# Patient Record
Sex: MALE | Race: WHITE | HISPANIC OR LATINO | Employment: FULL TIME | URBAN - NONMETROPOLITAN AREA
[De-identification: names, ages, dates, MRNs, and addresses within clinical notes are randomized per-mention and may not be internally consistent; named-entity substitution may affect disease eponyms.]

---

## 2021-11-26 ENCOUNTER — OFFICE VISIT (OUTPATIENT)
Dept: URGENT CARE | Facility: CLINIC | Age: 36
End: 2021-11-26
Payer: COMMERCIAL

## 2021-11-26 VITALS
OXYGEN SATURATION: 96 % | WEIGHT: 266 LBS | HEIGHT: 72 IN | BODY MASS INDEX: 36.03 KG/M2 | DIASTOLIC BLOOD PRESSURE: 90 MMHG | SYSTOLIC BLOOD PRESSURE: 134 MMHG | TEMPERATURE: 97.2 F | RESPIRATION RATE: 16 BRPM | HEART RATE: 80 BPM

## 2021-11-26 DIAGNOSIS — S16.1XXA STRAIN OF NECK MUSCLE, INITIAL ENCOUNTER: ICD-10-CM

## 2021-11-26 DIAGNOSIS — S29.012A UPPER BACK STRAIN, INITIAL ENCOUNTER: ICD-10-CM

## 2021-11-26 PROCEDURE — 99203 OFFICE O/P NEW LOW 30 MIN: CPT | Performed by: NURSE PRACTITIONER

## 2021-11-26 RX ORDER — CYCLOBENZAPRINE HCL 10 MG
10 TABLET ORAL 3 TIMES DAILY PRN
Qty: 30 TABLET | Refills: 0 | Status: SHIPPED | OUTPATIENT
Start: 2021-11-26 | End: 2022-05-05

## 2021-11-26 RX ORDER — KETOROLAC TROMETHAMINE 30 MG/ML
30 INJECTION, SOLUTION INTRAMUSCULAR; INTRAVENOUS ONCE
Status: COMPLETED | OUTPATIENT
Start: 2021-11-26 | End: 2021-11-26

## 2021-11-26 RX ORDER — LOSARTAN POTASSIUM 25 MG/1
25 TABLET ORAL DAILY
COMMUNITY

## 2021-11-26 RX ORDER — DICLOFENAC SODIUM 75 MG/1
75 TABLET, DELAYED RELEASE ORAL 2 TIMES DAILY
Qty: 20 TABLET | Refills: 0 | Status: SHIPPED | OUTPATIENT
Start: 2021-11-26 | End: 2022-05-05

## 2021-11-26 RX ORDER — PAROXETINE 10 MG/1
37.5 TABLET, FILM COATED ORAL DAILY
COMMUNITY
End: 2022-05-05

## 2021-11-26 RX ADMIN — KETOROLAC TROMETHAMINE 30 MG: 30 INJECTION, SOLUTION INTRAMUSCULAR; INTRAVENOUS at 14:32

## 2021-11-26 ASSESSMENT — ENCOUNTER SYMPTOMS
BACK PAIN: 1
NECK PAIN: 1

## 2021-11-26 NOTE — PROGRESS NOTES
Subjective     Gabriel Matthews is a 36 y.o. male who presents with Shoulder Pain (R shoulder pain and soreness started 5 days ago)    No past medical history on file.  Social History     Socioeconomic History   • Marital status:      Spouse name: Not on file   • Number of children: Not on file   • Years of education: Not on file   • Highest education level: Not on file   Occupational History   • Not on file   Tobacco Use   • Smoking status: Never Smoker   • Smokeless tobacco: Never Used   Substance and Sexual Activity   • Alcohol use: Never   • Drug use: Never   • Sexual activity: Not on file   Other Topics Concern   • Not on file   Social History Narrative   • Not on file     Social Determinants of Health     Financial Resource Strain:    • Difficulty of Paying Living Expenses: Not on file   Food Insecurity:    • Worried About Running Out of Food in the Last Year: Not on file   • Ran Out of Food in the Last Year: Not on file   Transportation Needs:    • Lack of Transportation (Medical): Not on file   • Lack of Transportation (Non-Medical): Not on file   Physical Activity:    • Days of Exercise per Week: Not on file   • Minutes of Exercise per Session: Not on file   Stress:    • Feeling of Stress : Not on file   Social Connections:    • Frequency of Communication with Friends and Family: Not on file   • Frequency of Social Gatherings with Friends and Family: Not on file   • Attends Sikh Services: Not on file   • Active Member of Clubs or Organizations: Not on file   • Attends Club or Organization Meetings: Not on file   • Marital Status: Not on file   Intimate Partner Violence:    • Fear of Current or Ex-Partner: Not on file   • Emotionally Abused: Not on file   • Physically Abused: Not on file   • Sexually Abused: Not on file   Housing Stability:    • Unable to Pay for Housing in the Last Year: Not on file   • Number of Places Lived in the Last Year: Not on file   • Unstable Housing in the Last Year: Not  on file     No family history on file.    Allergies: Patient has no known allergies.    Patient is a 36-year-old male who presents today with complaint of pain to the right upper back and neck area.  Patient states he was ill last week and was in bed.  He states as a result he believes he strained his right side of neck and upper back.  Endorses pain with range of motion and any movement.        Shoulder Pain  This is a new problem. The current episode started in the past 7 days. The problem occurs constantly. The problem has been unchanged. Associated symptoms include neck pain. Associated symptoms comments: Neck and upper back pain. Nothing aggravates the symptoms. He has tried nothing for the symptoms.       Review of Systems   Musculoskeletal: Positive for back pain and neck pain.   All other systems reviewed and are negative.             Objective     /90   Pulse 80   Temp 36.2 °C (97.2 °F)   Resp 16   Ht 1.829 m (6')   Wt 121 kg (266 lb)   SpO2 96%   BMI 36.08 kg/m²      Physical Exam  Vitals reviewed.   Constitutional:       Appearance: Normal appearance.   Skin:     General: Skin is warm and dry.   Neurological:      General: No focal deficit present.      Mental Status: He is alert and oriented to person, place, and time.   Psychiatric:         Mood and Affect: Mood normal.         Behavior: Behavior normal.       There is point tenderness to the right side of the neck and the upper back.  Pain is reproduced with flexion extension of the neck and also with lateral rotation of the neck.  Point tenderness to the right upper back.                      Assessment & Plan   Neck muscle strain  Right upper back strain    Alternate ice and heat  Diclofenac  Flexeril; clearly stated no driving or alcohol with this medication  Topical muscle rub of choice  Follow up for persistent symptoms  Toradol 30 mg IM given in office      There are no diagnoses linked to this encounter.

## 2022-04-14 ENCOUNTER — OFFICE VISIT (OUTPATIENT)
Dept: INTERNAL MEDICINE | Facility: OTHER | Age: 37
End: 2022-04-14
Payer: COMMERCIAL

## 2022-04-14 VITALS
DIASTOLIC BLOOD PRESSURE: 77 MMHG | SYSTOLIC BLOOD PRESSURE: 114 MMHG | OXYGEN SATURATION: 94 % | WEIGHT: 262.2 LBS | HEIGHT: 72 IN | BODY MASS INDEX: 35.51 KG/M2 | HEART RATE: 89 BPM | TEMPERATURE: 97 F

## 2022-04-14 DIAGNOSIS — I10 PRIMARY HYPERTENSION: ICD-10-CM

## 2022-04-14 DIAGNOSIS — F41.9 ANXIETY DISORDER, UNSPECIFIED TYPE: ICD-10-CM

## 2022-04-14 DIAGNOSIS — E66.9 OBESITY (BMI 35.0-39.9 WITHOUT COMORBIDITY): ICD-10-CM

## 2022-04-14 DIAGNOSIS — E66.9 OBESITY (BMI 30-39.9): ICD-10-CM

## 2022-04-14 PROCEDURE — 99214 OFFICE O/P EST MOD 30 MIN: CPT | Mod: GC | Performed by: STUDENT IN AN ORGANIZED HEALTH CARE EDUCATION/TRAINING PROGRAM

## 2022-04-14 RX ORDER — HYDROXYZINE HYDROCHLORIDE 25 MG/1
50 TABLET, FILM COATED ORAL EVERY 6 HOURS PRN
Qty: 30 TABLET | Refills: 2 | Status: SHIPPED | OUTPATIENT
Start: 2022-04-14 | End: 2022-05-05

## 2022-04-14 RX ORDER — PAROXETINE HYDROCHLORIDE HEMIHYDRATE 37.5 MG/1
TABLET, FILM COATED, EXTENDED RELEASE ORAL
COMMUNITY
Start: 2022-04-14 | End: 2022-05-05 | Stop reason: SDUPTHER

## 2022-04-14 RX ORDER — CLONAZEPAM 1 MG/1
TABLET ORAL
COMMUNITY
Start: 2022-03-17 | End: 2022-05-05 | Stop reason: SDUPTHER

## 2022-04-14 ASSESSMENT — PATIENT HEALTH QUESTIONNAIRE - PHQ9: CLINICAL INTERPRETATION OF PHQ2 SCORE: 0

## 2022-04-14 NOTE — PATIENT INSTRUCTIONS
-Exercise and balanced nutrition  -Take medications as prescribed  -Follow-up with psychiatry  -Follow-up with PCP as scheduled or return to clinic earlier if any symptoms.  -start Hydroxyzine 50 mg Q6 hr as needed for anxiety

## 2022-04-14 NOTE — PROGRESS NOTES
New patient    Patient Care Team:  Ludwin Mustafa M.D. as PCP - General (Internal Medicine)    Gabriel Matthews is a 36 y.o. male who presents today with the following Chief Complaint(s): Follow up for Diagnoses of Primary hypertension, Anxiety disorder, unspecified type, Obesity (BMI 35.0-39.9 without comorbidity), and Obesity (BMI 30-39.9) were pertinent to this visit.    HPI:    36-year-old gentleman with known history of hypertension and anxiety disorder came for office visit to establish care    #Hypertension  -Well-controlled on losartan 25 mg daily    #Anxiety disorder  -Chronic, was on paroxetine since the age of low 20s  -Mention history of acute panic attacks along with social anxiety, clonazepam helps in the situations, was on Xanax prior to this.    #Obesity  -Education counseling provided  -Denied any abrupt weight gain recently.    Problem   Obesity (Bmi 30-39.9)        ROS:     Denies any new chest pain or shortness of breath.  No changes to urinary or bowel function. See HPI.    No past medical history on file.  Social History     Tobacco Use   • Smoking status: Never Smoker   • Smokeless tobacco: Never Used   Vaping Use   • Vaping Use: Never used   Substance Use Topics   • Alcohol use: Never   • Drug use: Never     Current Outpatient Medications   Medication Sig Dispense Refill   • clonazePAM (KLONOPIN) 1 MG Tab      • paroxetine (PAXIL-CR) 37.5 MG CR tablet      • hydrOXYzine HCl (ATARAX) 25 MG Tab Take 2 Tablets by mouth every 6 hours as needed for Anxiety. 30 Tablet 2   • losartan (COZAAR) 25 MG Tab Take 25 mg by mouth every day.     • PARoxetine (PAXIL) 10 MG Tab Take 37.5 mg by mouth every day. (Patient not taking: Reported on 4/14/2022)     • cyclobenzaprine (FLEXERIL) 10 mg Tab Take 1 Tablet by mouth 3 times a day as needed. (Patient not taking: Reported on 4/14/2022) 30 Tablet 0   • diclofenac DR (VOLTAREN) 75 MG Tablet Delayed Response Take 1 Tablet by mouth 2 times a day.  (Patient not taking: Reported on 4/14/2022) 20 Tablet 0     No current facility-administered medications for this visit.     Physical Exam:  /77 (BP Location: Left arm, Patient Position: Sitting, BP Cuff Size: Adult)   Pulse 89   Temp 36.1 °C (97 °F) (Temporal)   Ht 1.829 m (6')   Wt 119 kg (262 lb 3.2 oz)   SpO2 94%   BMI 35.56 kg/m²   General: Well developed, obese, well nourished male, in no distress.  Eyes: Conjuntiva without any obvious injection or erythema.   Cardiovascular: Heart is regular with no murmur  Lungs: Clear to auscultation bilaterally. No wheezes, rhonci or crackles heard. Respiratory effort is normal.  Abd: Soft, non-tender  Ext: No edema    Assessment and Plan:   1. Primary hypertension  Well-controlled, continue losartan 25 mg daily  -Recommended maintaining a BP log and bring it next visit.  - CBC WITH DIFFERENTIAL; Future  - Comp Metabolic Panel; Future    2. Anxiety disorder, unspecified type  -Chronic, on paroxetine, clonazepam for acute anxiety attacks  -Patient mentioned that clonazepam helps but makes him drowsy/sleepy and fatigued.  -Education and counseling provided regarding risk versus benefit of clonazepam and recommended initiating hydroxyzine, patient acknowledged and agreed with the plan.  - Referral to Psychiatry    3. Obesity (BMI 35.0-39.9 without comorbidity)  -Education and counseling provided, emphasized on adverse effects of obesity and associated long-term medical problems like hypertension or diabetes.  -Recommended exercise at least 3 times a week with balanced nutrition  -We will follow-up.  - CBC WITH DIFFERENTIAL; Future  - Comp Metabolic Panel; Future  - Lipid Profile; Future  - Patient identified as having weight management issue.  Appropriate orders and counseling given.  - Referral to Nutrition Services     Return in about 2 months (around 6/14/2022).  Patient Instructions   -Exercise and balanced nutrition  -Take medications as  prescribed  -Follow-up with psychiatry  -Follow-up with PCP as scheduled or return to clinic earlier if any symptoms.  -start Hydroxyzine 50 mg Q6 hr as needed for anxiety

## 2022-05-05 ENCOUNTER — OFFICE VISIT (OUTPATIENT)
Dept: INTERNAL MEDICINE | Facility: OTHER | Age: 37
End: 2022-05-05
Payer: COMMERCIAL

## 2022-05-05 VITALS
HEART RATE: 72 BPM | HEIGHT: 72 IN | DIASTOLIC BLOOD PRESSURE: 84 MMHG | SYSTOLIC BLOOD PRESSURE: 132 MMHG | TEMPERATURE: 97.7 F | BODY MASS INDEX: 35.54 KG/M2 | WEIGHT: 262.4 LBS | OXYGEN SATURATION: 94 %

## 2022-05-05 DIAGNOSIS — F41.1 GENERALIZED ANXIETY DISORDER: ICD-10-CM

## 2022-05-05 PROBLEM — I10 PRIMARY HYPERTENSION: Status: ACTIVE | Noted: 2022-05-05

## 2022-05-05 PROBLEM — I10 PRIMARY HYPERTENSION: Status: ACTIVE | Noted: 2022-04-14

## 2022-05-05 PROCEDURE — 99213 OFFICE O/P EST LOW 20 MIN: CPT | Mod: GE | Performed by: STUDENT IN AN ORGANIZED HEALTH CARE EDUCATION/TRAINING PROGRAM

## 2022-05-05 RX ORDER — CLONAZEPAM 1 MG/1
1 TABLET ORAL
Qty: 30 TABLET | Refills: 0 | Status: SHIPPED | OUTPATIENT
Start: 2022-05-05 | End: 2022-06-09

## 2022-05-05 RX ORDER — PAROXETINE HYDROCHLORIDE HEMIHYDRATE 37.5 MG/1
37.5 TABLET, FILM COATED, EXTENDED RELEASE ORAL DAILY
Qty: 90 TABLET | Refills: 1 | Status: SHIPPED | OUTPATIENT
Start: 2022-05-05

## 2022-05-05 ASSESSMENT — ENCOUNTER SYMPTOMS
MYALGIAS: 0
HEADACHES: 0
FEVER: 0
DOUBLE VISION: 0
BLURRED VISION: 0
WEAKNESS: 0
CHILLS: 0
SHORTNESS OF BREATH: 0
CONSTIPATION: 0
NAUSEA: 0
COUGH: 0
ABDOMINAL PAIN: 0
PALPITATIONS: 0
VOMITING: 0
DEPRESSION: 0
DIZZINESS: 0
NERVOUS/ANXIOUS: 1
DIARRHEA: 0

## 2022-05-05 ASSESSMENT — LIFESTYLE VARIABLES: SUBSTANCE_ABUSE: 0

## 2022-05-05 NOTE — PROGRESS NOTES
Established Patient    Gabriel Matthews is a 36 YOM with PMHx of obesity, HTN, anxiety who presents today with the following:    CC: Anxiety follow-up    HPI:      Anxiety - was seen 3 weeks ago when establish care. Anxiety was discussed at previous visit - on Paroxetine with Clonazepam as needed for anxiety attacks. At last visit, Clonazepam was discontinued and Hydroxyzine was added as needed. Psychiatry referral was also placed. Today, he reports that the Hydroxyzine was not very helpful - he could feel himself getting very anxious. He reports that he has been on Clonazepam for 16 months prior to discontinuing it. Per pt, he has acute anxiety attacks almost daily - he used to be on Xanax but transitioned to Clonazepam for longer attacks. As for Psychiatry, he says he is talking to his insurance to figure out which group to see.     Review of Systems   Constitutional: Negative for chills and fever.   HENT: Negative for hearing loss and tinnitus.    Eyes: Negative for blurred vision and double vision.   Respiratory: Negative for cough and shortness of breath.    Cardiovascular: Negative for chest pain, palpitations and leg swelling.   Gastrointestinal: Negative for abdominal pain, constipation, diarrhea, nausea and vomiting.   Musculoskeletal: Negative for joint pain and myalgias.   Neurological: Negative for dizziness, weakness and headaches.   Psychiatric/Behavioral: Negative for depression and substance abuse. The patient is nervous/anxious.        Patient Active Problem List    Diagnosis Date Noted   • Obesity (BMI 30-39.9) 04/14/2022   • Primary hypertension 04/14/2022   • Generalized anxiety disorder 04/14/2022       Social History     Tobacco Use   • Smoking status: Never Smoker   • Smokeless tobacco: Never Used   Vaping Use   • Vaping Use: Never used   Substance Use Topics   • Alcohol use: Never   • Drug use: Never       Current Outpatient Medications   Medication Sig Dispense Refill   • paroxetine (PAXIL-CR)  37.5 MG CR tablet Take 1 Tablet by mouth every day. 90 Tablet 1   • clonazePAM (KLONOPIN) 1 MG Tab      • losartan (COZAAR) 25 MG Tab Take 25 mg by mouth every day.       No current facility-administered medications for this visit.       /84 (BP Location: Left arm, Patient Position: Sitting, BP Cuff Size: Large adult long) Comment: repeat bp 5 min  Pulse 72   Temp 36.5 °C (97.7 °F) (Temporal)   Ht 1.829 m (6')   Wt 119 kg (262 lb 6.4 oz)   SpO2 94%   BMI 35.59 kg/m²     PHYSICAL EXAM:  Physical Exam  Constitutional:       General: He is not in acute distress.     Appearance: Normal appearance. He is not ill-appearing.   HENT:      Head: Normocephalic and atraumatic.      Mouth/Throat:      Mouth: Mucous membranes are moist.   Eyes:      Extraocular Movements: Extraocular movements intact.      Conjunctiva/sclera: Conjunctivae normal.   Cardiovascular:      Rate and Rhythm: Normal rate and regular rhythm.      Heart sounds: Normal heart sounds. No murmur heard.    No friction rub. No gallop.   Pulmonary:      Effort: Pulmonary effort is normal. No respiratory distress.      Breath sounds: Normal breath sounds. No wheezing or rales.   Abdominal:      General: Bowel sounds are normal. There is no distension.      Palpations: Abdomen is soft.      Tenderness: There is no abdominal tenderness.   Musculoskeletal:      Right lower leg: No edema.      Left lower leg: No edema.   Neurological:      General: No focal deficit present.      Mental Status: He is alert and oriented to person, place, and time. Mental status is at baseline.   Psychiatric:         Mood and Affect: Mood normal.         Behavior: Behavior normal.       Assessment and Plan  Generalized anxiety disorder  - Long-standing history, normally on Paxil - was previously on Clonazepam but switched to Hydroxyzine at last visit     - Per pt, he felt Hydroxyzine did not help much as he was still having frequent breakthrough anxiety attacks  - Had  patient sign controlled substance agreement today in clinic     - Reviewed PDMP - no other prescriptions for controlled substances  - Will prescribe one month supply of Clonazepam (educated pt about harmful side effects)  - Encouraged pt to follow-up with Psychiatry as they will be in charge of his anxiety meds in the future  - UDS ordered      Return in about 7 weeks (around 6/23/2022).    Signed by: Kishan Alexandre M.D.

## 2022-05-05 NOTE — ASSESSMENT & PLAN NOTE
- Long-standing history, normally on Paxil - was previously on Clonazepam but switched to Hydroxyzine at last visit     - Per pt, he felt Hydroxyzine did not help much as he was still having frequent breakthrough anxiety attacks  - Had patient sign controlled substance agreement today in clinic     - Reviewed PDMP - no other prescriptions for controlled substances  - Will prescribe one month supply of Clonazepam (educated pt about harmful side effects)  - Encouraged pt to follow-up with Psychiatry as they will be in charge of his anxiety meds in the future  - UDS ordered

## 2022-05-31 DIAGNOSIS — F41.1 GENERALIZED ANXIETY DISORDER: ICD-10-CM

## 2022-06-01 NOTE — TELEPHONE ENCOUNTER
Last seen: 5/5/22 by Dr. Alexandre   Next appt: none     Was the patient seen in the last year in this department? Yes   Does patient have an active prescription for medications requested? No   Received Request Via: Pharmacy

## 2022-06-07 NOTE — TELEPHONE ENCOUNTER
Patient called stating he is barely getting established with Dr. Fang office so he will be needing refills on his klonopin until his appt at the ends of July

## 2022-06-07 NOTE — TELEPHONE ENCOUNTER
Ludwin Mustafa M.D.  to Leopoldo Roland, Med Ass't    [place]      7:53 PM  As per Dr. Alexandre's note, he started the patient on clonazepam, patient was supposed to follow up with psychiatry and receive anxiety medications. Please see if the patient has seen psychiatry, if not I am happy to refill if attending is ok with it.

## 2022-06-09 RX ORDER — CLONAZEPAM 1 MG/1
1 TABLET ORAL
Qty: 30 TABLET | Refills: 0 | Status: SHIPPED | OUTPATIENT
Start: 2022-06-09 | End: 2022-07-09

## 2022-06-14 ENCOUNTER — APPOINTMENT (OUTPATIENT)
Dept: INTERNAL MEDICINE | Facility: OTHER | Age: 37
End: 2022-06-14
Payer: COMMERCIAL

## 2025-07-21 ENCOUNTER — HOSPITAL ENCOUNTER (EMERGENCY)
Facility: MEDICAL CENTER | Age: 40
End: 2025-07-21
Attending: EMERGENCY MEDICINE

## 2025-07-21 ASSESSMENT — PAIN DESCRIPTION - PAIN TYPE: TYPE: ACUTE PAIN
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
## 2025-07-21 NOTE — ED NOTES
I will submit an appeal if you don't mind writing a letter, I will send that in as soon as its ready,also if you still have that denial letter could you give that to me so I know where to send that? .       Patient was given sample of 25 mg for 1 a day PO med given, update to pt regarding dc

## 2025-07-21 NOTE — ED NOTES
Bedside report received from prior RN. Pt alert. Resp normal/unlabored on RA. Bed side rails up/in low position. Pt updated to POC and all questions answered.     Assisted standing to urinate, given warm blankets

## 2025-07-21 NOTE — ED NOTES
Pt discharged, all appropriate hospital equipment removed (IV, monitor, pulse ox, etc.). Pt left unit via walking with staff to lobby for p/u via taxi. Personal belongings with pt when leaving unit. Pt given discharge instructions prior to leaving ER, including where to  prescriptions and when to follow-up if applicable; verbalizes understanding. Pt informed to return to ED if symptoms worsen/return or altered status develop. Copy of discharge instructions signed and turned into DC basket and copy sent with pt.
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
